# Patient Record
Sex: MALE | Race: WHITE | NOT HISPANIC OR LATINO | Employment: OTHER | URBAN - METROPOLITAN AREA
[De-identification: names, ages, dates, MRNs, and addresses within clinical notes are randomized per-mention and may not be internally consistent; named-entity substitution may affect disease eponyms.]

---

## 2021-12-30 PROBLEM — E87.6 HYPOKALEMIA: Status: ACTIVE | Noted: 2021-12-30

## 2021-12-30 PROBLEM — N39.0 SEPSIS SECONDARY TO UTI: Status: ACTIVE | Noted: 2021-12-30

## 2021-12-30 PROBLEM — E83.42 HYPOMAGNESEMIA: Status: ACTIVE | Noted: 2021-12-30

## 2021-12-30 PROBLEM — N28.9 ACUTE RENAL INSUFFICIENCY: Status: ACTIVE | Noted: 2021-12-30

## 2021-12-30 PROBLEM — G93.41 ACUTE METABOLIC ENCEPHALOPATHY: Status: ACTIVE | Noted: 2021-12-30

## 2021-12-30 PROBLEM — A41.9 SEPSIS SECONDARY TO UTI: Status: ACTIVE | Noted: 2021-12-30

## 2022-01-01 PROBLEM — R19.7 DIARRHEA: Status: ACTIVE | Noted: 2022-01-01

## 2022-06-17 PROBLEM — K40.90 LEFT INGUINAL HERNIA: Status: ACTIVE | Noted: 2022-06-17

## 2022-12-19 PROBLEM — G93.41 ACUTE METABOLIC ENCEPHALOPATHY: Status: RESOLVED | Noted: 2021-12-30 | Resolved: 2022-12-19

## 2022-12-19 PROBLEM — N28.9 ACUTE RENAL INSUFFICIENCY: Status: RESOLVED | Noted: 2021-12-30 | Resolved: 2022-12-19

## 2023-06-19 PROBLEM — N39.0 SEPSIS SECONDARY TO UTI: Status: RESOLVED | Noted: 2021-12-30 | Resolved: 2023-06-19

## 2023-06-19 PROBLEM — A41.9 SEPSIS SECONDARY TO UTI: Status: RESOLVED | Noted: 2021-12-30 | Resolved: 2023-06-19

## 2023-11-20 ENCOUNTER — TELEPHONE (OUTPATIENT)
Dept: NEUROLOGY | Facility: CLINIC | Age: 88
End: 2023-11-20
Payer: MEDICARE

## 2023-11-20 NOTE — TELEPHONE ENCOUNTER
----- Message from Salo Horton sent at 11/20/2023  3:38 PM CST -----  Regarding: Sooner Appt  Contact: wife  Type:  Sooner Appointment Request    Caller is requesting a sooner appointment.  Caller declined first available appointment listed below.  Caller will not accept being placed on the waitlist and is requesting a message be sent to doctor.    Name of Caller:Pt's wife Lauren    When is the first available appointment?NONE    Symptoms:R41.3 (ICD-10-CM) - Memory loss / referral     Would the patient rather a call back or a response via [facility]? Call back     Best Call Back Number:[phone]        Additional Information: Needs to get the diagnosis for [facility] to get the ball rolling as soon as possible Please advise -- Thank you

## 2023-11-20 NOTE — TELEPHONE ENCOUNTER
Spoke with patients wife and informed Dr. Stevens does not have any new patient appointments until 2024 and that she would not be able to give a diagnosis without neuropsych testing and that would take another 4-6 months. Patients wife states he was seen at [facility] 3-4 yrs ago and she will request records from them to see if they gave patient a diagnosis.

## 2023-12-11 PROBLEM — R19.7 DIARRHEA: Status: RESOLVED | Noted: 2022-01-01 | Resolved: 2023-12-11

## 2023-12-20 PROBLEM — D69.8 OTHER SPECIFIED HEMORRHAGIC CONDITIONS: Status: ACTIVE | Noted: 2023-12-20

## 2023-12-20 PROBLEM — D69.8 OTHER SPECIFIED HEMORRHAGIC CONDITIONS: Status: RESOLVED | Noted: 2023-12-20 | Resolved: 2023-12-20

## 2023-12-20 PROBLEM — G30.9 ALZHEIMER'S DISEASE, UNSPECIFIED (CODE): Status: RESOLVED | Noted: 2023-12-20 | Resolved: 2023-12-20

## 2023-12-20 PROBLEM — G30.9 ALZHEIMER'S DISEASE, UNSPECIFIED (CODE): Status: ACTIVE | Noted: 2023-12-20

## 2024-01-12 PROBLEM — G30.9 ALZHEIMER'S DISEASE, UNSPECIFIED (CODE): Status: ACTIVE | Noted: 2024-01-12
